# Patient Record
Sex: MALE | URBAN - METROPOLITAN AREA
[De-identification: names, ages, dates, MRNs, and addresses within clinical notes are randomized per-mention and may not be internally consistent; named-entity substitution may affect disease eponyms.]

---

## 2021-06-01 ENCOUNTER — OFFICE VISIT (OUTPATIENT)
Dept: URBAN - METROPOLITAN AREA CLINIC 91 | Facility: CLINIC | Age: 46
End: 2021-06-01
Payer: COMMERCIAL

## 2021-06-01 DIAGNOSIS — H04.123 DRY EYE SYNDROME OF BILATERAL LACRIMAL GLANDS: Primary | ICD-10-CM

## 2021-06-01 PROCEDURE — 99204 OFFICE O/P NEW MOD 45 MIN: CPT | Performed by: SPECIALIST

## 2021-06-01 PROCEDURE — 92025 CPTRIZED CORNEAL TOPOGRAPHY: CPT | Performed by: SPECIALIST

## 2021-06-01 RX ORDER — PREDNISOLONE ACETATE 10 MG/ML
1 % SUSPENSION/ DROPS OPHTHALMIC
Qty: 5 | Refills: 1 | Status: INACTIVE
Start: 2021-06-01 | End: 2021-08-23

## 2021-06-01 ASSESSMENT — VISUAL ACUITY
OD: 20/30
OS: 20/20

## 2021-06-01 ASSESSMENT — INTRAOCULAR PRESSURE
OS: 15
OD: 14

## 2021-06-01 NOTE — IMPRESSION/PLAN
Impression: Dry eye syndrome of bilateral lacrimal glands: H04.123. Plan: For dry eye syndrome, I have recommended patient use a good quality artificial tear 4-6 times per day. Patient had Mercedes 86 done in [facility], healing well, recommend to start PF gtts TID x3 weeks
wait 5 mins with lubrication, Patient to bring numbers before sx next visit.

## 2021-06-21 ENCOUNTER — OFFICE VISIT (OUTPATIENT)
Dept: URBAN - METROPOLITAN AREA CLINIC 91 | Facility: CLINIC | Age: 46
End: 2021-06-21
Payer: COMMERCIAL

## 2021-06-21 PROCEDURE — 99213 OFFICE O/P EST LOW 20 MIN: CPT | Performed by: SPECIALIST

## 2021-06-21 NOTE — IMPRESSION/PLAN
Impression: Dry eye syndrome of bilateral lacrimal glands: H04.123. Plan: For dry eye syndrome, I have recommended patient use a good quality artificial tear 4-6 times per day. I have also discussed alternatives such as placement of punctal plugs and topical Restasis drops. Prednisolone QD OU for 1 week then discontinue.

## 2021-08-23 ENCOUNTER — OFFICE VISIT (OUTPATIENT)
Dept: URBAN - METROPOLITAN AREA CLINIC 91 | Facility: CLINIC | Age: 46
End: 2021-08-23
Payer: COMMERCIAL

## 2021-08-23 DIAGNOSIS — H52.13 MYOPIA, BILATERAL: ICD-10-CM

## 2021-08-23 PROCEDURE — 99213 OFFICE O/P EST LOW 20 MIN: CPT | Performed by: SPECIALIST

## 2021-08-23 PROCEDURE — 92025 CPTRIZED CORNEAL TOPOGRAPHY: CPT | Performed by: SPECIALIST

## 2021-08-23 ASSESSMENT — VISUAL ACUITY
OD: 20/20
OS: 20/20

## 2021-08-23 ASSESSMENT — INTRAOCULAR PRESSURE
OD: 15
OS: 14

## 2021-08-23 NOTE — IMPRESSION/PLAN
Impression: Myopia, bilateral: H52.13. Plan: S/P PRK OU done in [facility], healing well, explained under corrected and may need PRK touch up, recommend to wait until eyes fully heal. Will continue to monitor, New MRx done today ok to dispense. 

Pre PRK
+1.00 -0.25 015
+1.75 -2.50 073